# Patient Record
Sex: FEMALE | Race: WHITE | URBAN - NONMETROPOLITAN AREA
[De-identification: names, ages, dates, MRNs, and addresses within clinical notes are randomized per-mention and may not be internally consistent; named-entity substitution may affect disease eponyms.]

---

## 2017-08-08 ENCOUNTER — HOSPITAL ENCOUNTER (EMERGENCY)
Facility: HOSPITAL | Age: 42
Discharge: HOME | End: 2017-08-08
Attending: NURSE PRACTITIONER | Admitting: FAMILY MEDICINE
Payer: MEDICARE

## 2017-08-08 VITALS
OXYGEN SATURATION: 98 % | TEMPERATURE: 98.5 F | HEART RATE: 83 BPM | DIASTOLIC BLOOD PRESSURE: 90 MMHG | RESPIRATION RATE: 16 BRPM | SYSTOLIC BLOOD PRESSURE: 131 MMHG

## 2017-08-08 DIAGNOSIS — Z51.89 VISIT FOR WOUND CHECK: ICD-10-CM

## 2017-08-08 LAB
ANION GAP SERPL CALCULATED.3IONS-SCNC: 6 MMOL/L (ref 3–14)
BASOPHILS # BLD AUTO: 0 10E9/L (ref 0–0.2)
BASOPHILS NFR BLD AUTO: 0.4 %
BUN SERPL-MCNC: 16 MG/DL (ref 7–30)
CALCIUM SERPL-MCNC: 9 MG/DL (ref 8.5–10.1)
CHLORIDE SERPL-SCNC: 111 MMOL/L (ref 94–109)
CO2 SERPL-SCNC: 28 MMOL/L (ref 20–32)
CREAT SERPL-MCNC: 1.1 MG/DL (ref 0.52–1.04)
DIFFERENTIAL METHOD BLD: NORMAL
EOSINOPHIL # BLD AUTO: 0.1 10E9/L (ref 0–0.7)
EOSINOPHIL NFR BLD AUTO: 1.4 %
ERYTHROCYTE [DISTWIDTH] IN BLOOD BY AUTOMATED COUNT: 13.2 % (ref 10–15)
GFR SERPL CREATININE-BSD FRML MDRD: 54 ML/MIN/1.7M2
GLUCOSE SERPL-MCNC: 78 MG/DL (ref 70–99)
HCT VFR BLD AUTO: 40.1 % (ref 35–47)
HGB BLD-MCNC: 13.2 G/DL (ref 11.7–15.7)
IMM GRANULOCYTES # BLD: 0 10E9/L (ref 0–0.4)
IMM GRANULOCYTES NFR BLD: 0.2 %
LYMPHOCYTES # BLD AUTO: 3.1 10E9/L (ref 0.8–5.3)
LYMPHOCYTES NFR BLD AUTO: 30.1 %
MCH RBC QN AUTO: 30.5 PG (ref 26.5–33)
MCHC RBC AUTO-ENTMCNC: 32.9 G/DL (ref 31.5–36.5)
MCV RBC AUTO: 93 FL (ref 78–100)
MONOCYTES # BLD AUTO: 0.6 10E9/L (ref 0–1.3)
MONOCYTES NFR BLD AUTO: 5.9 %
NEUTROPHILS # BLD AUTO: 6.4 10E9/L (ref 1.6–8.3)
NEUTROPHILS NFR BLD AUTO: 62 %
NRBC # BLD AUTO: 0 10*3/UL
NRBC BLD AUTO-RTO: 0 /100
PLATELET # BLD AUTO: 258 10E9/L (ref 150–450)
POTASSIUM SERPL-SCNC: 4.1 MMOL/L (ref 3.4–5.3)
RBC # BLD AUTO: 4.33 10E12/L (ref 3.8–5.2)
SODIUM SERPL-SCNC: 145 MMOL/L (ref 133–144)
WBC # BLD AUTO: 10.3 10E9/L (ref 4–11)

## 2017-08-08 PROCEDURE — 87205 SMEAR GRAM STAIN: CPT | Performed by: FAMILY MEDICINE

## 2017-08-08 PROCEDURE — 87077 CULTURE AEROBIC IDENTIFY: CPT | Performed by: FAMILY MEDICINE

## 2017-08-08 PROCEDURE — 87186 SC STD MICRODIL/AGAR DIL: CPT | Performed by: FAMILY MEDICINE

## 2017-08-08 PROCEDURE — 99283 EMERGENCY DEPT VISIT LOW MDM: CPT | Performed by: FAMILY MEDICINE

## 2017-08-08 PROCEDURE — 85025 COMPLETE CBC W/AUTO DIFF WBC: CPT | Performed by: FAMILY MEDICINE

## 2017-08-08 PROCEDURE — 87070 CULTURE OTHR SPECIMN AEROBIC: CPT | Performed by: FAMILY MEDICINE

## 2017-08-08 PROCEDURE — 36415 COLL VENOUS BLD VENIPUNCTURE: CPT | Performed by: FAMILY MEDICINE

## 2017-08-08 PROCEDURE — 25000132 ZZH RX MED GY IP 250 OP 250 PS 637: Mod: GY | Performed by: FAMILY MEDICINE

## 2017-08-08 PROCEDURE — 99283 EMERGENCY DEPT VISIT LOW MDM: CPT

## 2017-08-08 PROCEDURE — 80048 BASIC METABOLIC PNL TOTAL CA: CPT | Performed by: FAMILY MEDICINE

## 2017-08-08 PROCEDURE — A9270 NON-COVERED ITEM OR SERVICE: HCPCS | Mod: GY | Performed by: FAMILY MEDICINE

## 2017-08-08 RX ORDER — IBUPROFEN 200 MG
1 CAPSULE ORAL ONCE
Status: DISCONTINUED | OUTPATIENT
Start: 2017-08-08 | End: 2017-08-08 | Stop reason: CLARIF

## 2017-08-08 RX ORDER — IBUPROFEN 200 MG
CAPSULE ORAL 2 TIMES DAILY
Status: DISCONTINUED | OUTPATIENT
Start: 2017-08-08 | End: 2017-08-08 | Stop reason: HOSPADM

## 2017-08-08 RX ORDER — DOXYCYCLINE 100 MG/1
100 CAPSULE ORAL 2 TIMES DAILY
Qty: 14 CAPSULE | Refills: 0 | Status: SHIPPED | OUTPATIENT
Start: 2017-08-08 | End: 2017-08-15

## 2017-08-08 RX ORDER — HYDROCODONE BITARTRATE AND ACETAMINOPHEN 5; 325 MG/1; MG/1
2 TABLET ORAL ONCE
Status: COMPLETED | OUTPATIENT
Start: 2017-08-08 | End: 2017-08-08

## 2017-08-08 RX ORDER — HYDROCODONE BITARTRATE AND ACETAMINOPHEN 5; 325 MG/1; MG/1
2 TABLET ORAL EVERY 8 HOURS PRN
Qty: 20 TABLET | Refills: 0 | Status: SHIPPED | OUTPATIENT
Start: 2017-08-08 | End: 2017-08-11

## 2017-08-08 RX ADMIN — HYDROCODONE BITARTRATE AND ACETAMINOPHEN 2 TABLET: 5; 325 TABLET ORAL at 19:36

## 2017-08-08 RX ADMIN — BACITRACIN ZINC, NEOMYCIN SULFATE, AND POLYMYXIN B SULFATE: 400; 3.5; 5 OINTMENT TOPICAL at 20:15

## 2017-08-08 ASSESSMENT — ENCOUNTER SYMPTOMS
CONSTIPATION: 0
DIARRHEA: 0
VOMITING: 0
PSYCHIATRIC NEGATIVE: 1
SHORTNESS OF BREATH: 0
ROS SKIN COMMENTS: RIGHT MEDIAL CALF
FEVER: 0
ACTIVITY CHANGE: 0
NAUSEA: 0
ABDOMINAL PAIN: 0
WOUND: 1
DYSURIA: 0
ARTHRALGIAS: 0
DIAPHORESIS: 0

## 2017-08-08 NOTE — ED AVS SNAPSHOT
[facility]    [address]    [place] [address]    Phone:  [phone]                                       Afshan Pollack   MRN: [id number]    Department:  [facility]   Date of Visit:  8/8/2017           Patient Information     Date Of Birth          1975        Your diagnoses for this visit were:     Visit for wound check        You were seen by Sharyn Conner NP and Della Parikh MD.      Follow-up Information     Follow up with [facility], [place].        Discharge Instructions         Recognizing and Treating Wound Infection  Wounds can become infected with harmful germs (bacteria). This prevents healing. It also increases your risk of scars. In some cases, the infection may spread to other parts of your body. And infection with the bacteria that cause tetanus can be fatal. Know what to look for and get prompt treatment for infection.    Risk factors  A wound is more likely to become infected if it:    Results from a hole (puncture), such as from a nail or piece of glass    Results from a human or animal bite    Isn't cleaned or treated within 8 hours    Occurs in your hand, foot, leg, armpit, or groin (the area where your belly meets your thighs)    Contains dirt or saliva    Heals very slowly    Occurs in a person with diabetes, alcoholism, or a compromised immune system    Symptoms of infection  Call your healthcare provider at the first sign of infection, such as:    Yellow, yellow-green, or foul-smelling drainage from a wound    More pain, swelling, or redness in or near a wound    A change in the color or size of a wound    Red streaks in the skin around the wound    Fever  Treatment  Treatment is likely to depend on the type of infection you have, and how serious it is. Your healthcare provider may prescribe oral antibiotics to help fight bacteria. Your provider may also flush the wound with an antibiotic solution or  apply an antibiotic ointment. Sometimes a pocket of pus (abscess) may form. In that case, the abscess will be opened and the fluid drained. You may need hospital care if the infection is very severe.  Preventing wound infection  Follow these steps to help keep wounds from getting infected:    Wash the wound right away with soap and water.    Apply a small amount of antibiotic ointment. You can buy this without a prescription.    Cover wounds with a bandage or gauze dressing. Change daily.    Keep the wound clean and dry for the first 24 hours.    Change the dressing daily using sterile gloves.   Date Last Reviewed: 8/13/2015 2000-2017 [employer]. [address] [place] [address]. All rights reserved. This information is not intended as a substitute for professional medical care. Always follow your healthcare professional's instructions.             Review of your medicines      START taking        Dose / Directions Last dose taken    doxycycline 100 MG capsule   Commonly known as:  VIBRAMYCIN   Dose:  100 mg   Quantity:  14 capsule        Take 1 capsule (100 mg) by mouth 2 times daily for 7 days   Refills:  0        HYDROcodone-acetaminophen 5-325 MG per tablet   Commonly known as:  NORCO   Dose:  2 tablet   Quantity:  20 tablet        Take 2 tablets by mouth every 8 hours as needed for moderate to severe pain   Refills:  0                Prescriptions were sent or printed at these locations (2 Prescriptions)                   [facility] [address] [place] [address] [address] AT [facility] [address] [place] [address]    Telephone:  [phone]   Fax:  [phone]   Hours:                  Printed at Department/Unit printer (2 of 2)         doxycycline (VIBRAMYCIN) 100 MG capsule               HYDROcodone-acetaminophen (NORCO) 5-325 MG per tablet                Procedures and tests performed during your visit     Basic metabolic panel    CBC with platelets  "differential    Gram stain    Wound Culture Aerobic Bacterial      Orders Needing Specimen Collection     None      Pending Results     Date and Time Order Name Status Description    2017 1940 Gram stain In process     2017 1923 Wound Culture Aerobic Bacterial In process             Pending Culture Results     Date and Time Order Name Status Description    2017 1940 Gram stain In process     2017 1923 Wound Culture Aerobic Bacterial In process             Thank you for choosing [facility]       Thank you for choosing [facility] for your care. Our goal is always to provide you with excellent care. Hearing back from our patients is one way we can continue to improve our services. Please take a few minutes to complete the written survey that you may receive in the mail after you visit with us. Thank you!        [employer] Information     [employer] lets you send messages to your doctor, view your test results, renew your prescriptions, schedule appointments and more. To sign up, go to [email or web address] . Click on \"Log in\" on the left side of the screen, which will take you to the Welcome page. Then click on \"Sign up Now\" on the right side of the page.     You will be asked to enter the access code listed below, as well as some personal information. Please follow the directions to create your username and password.     Your access code is: [email or web address]  Expires: 2017  9:00 PM     Your access code will  in 90 days. If you need help or a new code, please call your [facility] clinic or [phone].        Care EveryWhere ID     This is your Care EveryWhere ID. This could be used by other organizations to access your [facility] medical records  [id number]        Equal Access to Services     [facility]: Hadchriss Shelley, walakeda luqnavdeep, qaybta kaferny peña . So [facility] [phone].    ATENCIÓN: Si habla español, tiene a parnell disposición " servicios gratuitos de asistencia lingüística. Pepe joshi [phone].    We comply with applicable federal civil rights laws and [place] laws. We do not discriminate on the basis of race, color, national origin, age, disability sex, sexual orientation or gender identity.            After Visit Summary       This is your record. Keep this with you and show to your community pharmacist(s) and doctor(s) at your next visit.

## 2017-08-08 NOTE — ED AVS SNAPSHOT
[facility]    [address] [place] [address]    Phone:  [phone]                                       Afshan Pollack   MRN: [id number]    Department:  [facility]   Date of Visit:  8/8/2017           After Visit Summary Signature Page     I have received my discharge instructions, and my questions have been answered. I have discussed any challenges I see with this plan with the nurse or doctor.    ..........................................................................................................................................  Patient/Patient Representative Signature      ..........................................................................................................................................  Patient Representative Print Name and Relationship to Patient    ..................................................               ................................................  Date                                            Time    ..........................................................................................................................................  Reviewed by Signature/Title    ...................................................              ..............................................  Date                                                            Time

## 2017-08-08 NOTE — ED PROVIDER NOTES
"  History     Chief Complaint   Patient presents with     Wound Check     The history is provided by the patient. No  was used.     Afshan Pollack is a 41 year old female who presents for a wound check to her left lower leg. She was bitten by a brown recluse spider 7 years ago and has had several failed skin grafts. She was last on antibiotics 1 month ago for a wound infection to her left lower leg. Her leg started to \"flare up yesterday and oozing brown drainage.\" Wound has a \"burning and feels like it is on fire\" characteristic. She is visiting from out of town and has run out of her pain medication.     I have reviewed the Medications, Allergies, Past Medical and Surgical History, and Social History in the Epic system.      Review of Systems   Constitutional: Negative for activity change, appetite change, chills and fever.   HENT: Negative for trouble swallowing.    Respiratory: Negative for cough.    Gastrointestinal: Negative for nausea and vomiting.   Genitourinary: Negative for dysuria.   Skin: Positive for wound.        To left lower leg.    Neurological: Negative for numbness.   Psychiatric/Behavioral: Negative.        Physical Exam   BP: 131/90  Pulse: 83  Temp: 98.5  F (36.9  C)  Resp: 16  SpO2: 98 %  Physical Exam   Constitutional: She is oriented to person, place, and time. She appears well-developed and well-nourished. No distress.   HENT:   Head: Normocephalic.   Mouth/Throat: Oropharynx is clear and moist.   Cardiovascular: Normal rate, regular rhythm and intact distal pulses.    Pulmonary/Chest: Effort normal. No respiratory distress.   Abdominal: Soft. She exhibits no distension.   Neurological: She is alert and oriented to person, place, and time.   Skin: Skin is warm and dry. She is not diaphoretic. There is erythema.        Left medial lower leg with erythema and copious amounts of brown drainage with a foul odor. Skin appears macerated with red streaks.     Psychiatric: " She has a normal mood and affect. Her behavior is normal.   Nursing note and vitals reviewed.      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 Discussed case with Dr. Letha Cerna who agrees to transfer her to the ED for a higher level of care.     I have reviewed the nursing notes.    Discharge Medication List as of 8/8/2017  9:00 PM      START taking these medications    Details   doxycycline (VIBRAMYCIN) 100 MG capsule Take 1 capsule (100 mg) by mouth 2 times daily for 7 days, Disp-14 capsule, R-0, Local Print      HYDROcodone-acetaminophen (NORCO) 5-325 MG per tablet Take 2 tablets by mouth every 8 hours as needed for moderate to severe pain, Disp-20 tablet, R-0, Local Print             Final diagnoses:   Visit for wound check       DAMION Corona  8/8/2017  6:58 PM  [facility]         Sharyn Conner NP  08/14/17 1953       Sharyn Conner NP  08/14/17 1955

## 2017-08-09 LAB
GRAM STN SPEC: ABNORMAL
MICRO REPORT STATUS: ABNORMAL
SPECIMEN SOURCE: ABNORMAL

## 2017-08-09 NOTE — ED PROVIDER NOTES
History     Chief Complaint   Patient presents with     Wound Check     HPI  Afshan Pollack is a 41 year old female who presents to the ED with a large, deep wound that she states is from a brown recluse spider bite 7 years ago.  She states she has had multiple grafts on it, all have ultimately failed.  She was septic from one of the procedures last year.  At present she has erythema surrounding the wound and yellow drainage from the open area.  She is actually here because she is here visiting family and her visit got extended and she has run out of pain medication.  She states her appointment for her refill was yesterday, and needs meds for an additional 10 days.  I did tell her we cannot provide that much pain medication from the ED.    I have reviewed the Medications, Allergies, Past Medical and Surgical History, and Social History in the Epic system.    Allergies:   Allergies   Allergen Reactions     Ciprofloxacin      Latex      Macrobid [Nitrofurantoin]      Penicillins      Sulfa Drugs          No current facility-administered medications on file prior to encounter.   No current outpatient prescriptions on file prior to encounter.    There is no problem list on file for this patient.      No past surgical history on file.    Social History   Substance Use Topics     Smoking status: Not on file     Smokeless tobacco: Not on file     Alcohol use Not on file         There is no immunization history on file for this patient.    BMI: There is no height or weight on file to calculate BMI.      Review of Systems   Constitutional: Negative for activity change, diaphoresis and fever.   HENT: Negative.    Respiratory: Negative for shortness of breath.    Cardiovascular: Negative for chest pain.   Gastrointestinal: Negative for abdominal pain, constipation, diarrhea, nausea and vomiting.   Genitourinary: Negative for dysuria.        Does have urinary incontinence.   Musculoskeletal: Negative for arthralgias.  Gait problem: left hip.   Skin: Positive for wound.        Right medial calf   Psychiatric/Behavioral: Negative.        Physical Exam   BP: 131/90  Pulse: 83  Temp: 98.5  F (36.9  C)  Resp: 16  SpO2: 98 %  Physical Exam   Constitutional: She is oriented to person, place, and time. She appears well-developed and well-nourished. No distress.   HENT:   Head: Normocephalic and atraumatic.   Neck: Normal range of motion. Neck supple.   Cardiovascular: Normal rate and regular rhythm.    Pulmonary/Chest: Effort normal.   Abdominal: Soft. Bowel sounds are normal.   Musculoskeletal: Normal range of motion. She exhibits no edema.   Neurological: She is alert and oriented to person, place, and time.   Skin: Skin is warm.        Psychiatric: Her mood appears anxious. Cognition and memory are normal.   Patient is clear that the reason she is here is for pain medication, not treatment for the wound.   Nursing note and vitals reviewed.      ED Course     ED Course     Procedures        Labs Ordered and Resulted from Time of ED Arrival Up to the Time of Departure from the ED   BASIC METABOLIC PANEL - Abnormal; Notable for the following:        Result Value    Sodium 145 (*)     Chloride 111 (*)     Creatinine 1.10 (*)     GFR Estimate 54 (*)     All other components within normal limits   CBC WITH PLATELETS DIFFERENTIAL       Assessments & Plan (with Medical Decision Making)   Large open wound with irritant rash surrounding it, no lymphangitic spread.  Did obtain culture of wound.      Patient signed out to Dr. Rohith Garcia at change of shift.    I have reviewed the nursing notes.    I have reviewed the findings, diagnosis, plan and need for follow up with the patient.       Discharge Medication List as of 8/8/2017  9:00 PM      START taking these medications    Details   doxycycline (VIBRAMYCIN) 100 MG capsule Take 1 capsule (100 mg) by mouth 2 times daily for 7 days, Disp-14 capsule, R-0, Local Print      HYDROcodone-acetaminophen  (NORCO) 5-325 MG per tablet Take 2 tablets by mouth every 8 hours as needed for moderate to severe pain, Disp-20 tablet, R-0, Local Print             Final diagnoses:   Visit for wound check       8/8/2017   [facility]     Della Parikh MD  08/09/17 [id number]

## 2017-08-09 NOTE — DISCHARGE INSTRUCTIONS
Recognizing and Treating Wound Infection  Wounds can become infected with harmful germs (bacteria). This prevents healing. It also increases your risk of scars. In some cases, the infection may spread to other parts of your body. And infection with the bacteria that cause tetanus can be fatal. Know what to look for and get prompt treatment for infection.    Risk factors  A wound is more likely to become infected if it:    Results from a hole (puncture), such as from a nail or piece of glass    Results from a human or animal bite    Isn't cleaned or treated within 8 hours    Occurs in your hand, foot, leg, armpit, or groin (the area where your belly meets your thighs)    Contains dirt or saliva    Heals very slowly    Occurs in a person with diabetes, alcoholism, or a compromised immune system    Symptoms of infection  Call your healthcare provider at the first sign of infection, such as:    Yellow, yellow-green, or foul-smelling drainage from a wound    More pain, swelling, or redness in or near a wound    A change in the color or size of a wound    Red streaks in the skin around the wound    Fever  Treatment  Treatment is likely to depend on the type of infection you have, and how serious it is. Your healthcare provider may prescribe oral antibiotics to help fight bacteria. Your provider may also flush the wound with an antibiotic solution or apply an antibiotic ointment. Sometimes a pocket of pus (abscess) may form. In that case, the abscess will be opened and the fluid drained. You may need hospital care if the infection is very severe.  Preventing wound infection  Follow these steps to help keep wounds from getting infected:    Wash the wound right away with soap and water.    Apply a small amount of antibiotic ointment. You can buy this without a prescription.    Cover wounds with a bandage or gauze dressing. Change daily.    Keep the wound clean and dry for the first 24 hours.    Change the dressing daily  using sterile gloves.   Date Last Reviewed: 8/13/2015 2000-2017 [email or web address]. [address] [place] [address], [place] [address]. All rights reserved. This information is not intended as a substitute for professional medical care. Always follow your healthcare professional's instructions.

## 2017-08-09 NOTE — ED NOTES
"Patient up ambulating in hallway reporting pain medications didn't help \"its just on fire\" MD is aware  "

## 2017-08-09 NOTE — ED NOTES
D/c instructions reviewed with patient. RX's given for Norco and Vibramycin given and will fill at pharmacy of choice.  Encouraged to return with new or worsening symptoms. No questions or concerns. Dressing continues to stay in place and no shadowing noted.  Patient ambulated indep out of ED and has a friend picking her up.  Patient declines d/c vitals

## 2017-08-09 NOTE — ED NOTES
"Patient comes in with a wound infection on posterior left lower leg.  Patient reports that 7 years ago she was bit by a brown recluse spider and since then she has multiple skin grafts with a recent one done 4 month ago, pt reports that she gets them done every 6 months, /patient sees a wound specialist in [place] Dr. Armas.    Patient reported she just noticed that today it was worse and \"pouring drainage\" Patient reports pain 8/10 pain and shoots up to her knee.   "

## 2017-08-12 LAB
BACTERIA SPEC CULT: ABNORMAL
MICRO REPORT STATUS: ABNORMAL
MICROORGANISM SPEC CULT: ABNORMAL
MICROORGANISM SPEC CULT: ABNORMAL
SPECIMEN SOURCE: ABNORMAL

## 2017-08-12 NOTE — PROGRESS NOTES
DATE:  8/12/2017   TIME OF RECEIPT FROM LAB:  1000  LAB TEST:  WOUND CULTURE - LEFT LEG  LAB VALUE:  POSITIVE MRSA  RESULTS GIVEN WITH READ-BACK TO (PROVIDER):  DR. JOVANA DILLON  TIME LAB VALUE REPORTED TO PROVIDER:   1010  Results were reviewed by Dr. JOVANA Dillon, Doxycycline prescribed for patient is the appropriate treatment, notify patient of results and to continue antibiotic as prescribed.  After 3 attempts to contact patient throughout the day, pt answered the phone.  Notified of wound culture results, to continue antibiotic as prescribed and follow up with PCP.  Pt reported she has no local doctor.  She also reported she has a history of MRSA.  She initially felt well after starting the antibiotic, but for the past 12 hours she has some N/V and fever.  Advised patient she should come in to be seen as soon as possible for further treatment.  She stated she would try to get in to ED tonight.  Dr. JOVANA Dillon updated, no new orders.

## 2017-08-14 ASSESSMENT — ENCOUNTER SYMPTOMS
VOMITING: 0
PSYCHIATRIC NEGATIVE: 1
APPETITE CHANGE: 0
TROUBLE SWALLOWING: 0
ACTIVITY CHANGE: 0
FEVER: 0
WOUND: 1
DYSURIA: 0
NAUSEA: 0
COUGH: 0
CHILLS: 0
NUMBNESS: 0